# Patient Record
(demographics unavailable — no encounter records)

---

## 2024-10-22 NOTE — DISCUSSION/SUMMARY
[de-identified] : Patient I talked about the possible underlying etiology of her right knee pain.  Patient has a history of squatting causing pain and swelling this may be attributable to a possible meniscal tear.  Patient had the knee aspirated yielding approximately 60 cc of clear straw-colored fluid.  Patient was told to ice the knee continue take over-the-counter anti-inflammatories she was given a description of a Genutrain brace that I believe she should wear and hopefully that gives her enough support to get off the crutches and utilize a cane.  Due to the highly suspicious nature of her injury patient was sent for an MRI to help evaluate for possible bucket-handle tear of the meniscus.  We also be looking for any ACL pathology.  Patient be notified of any findings any change in treatment plan once the MRI images are available for review.  This consultation lasted 30 minutes.

## 2024-10-22 NOTE — REASON FOR VISIT
[Initial Visit] : an initial visit for [Knee Pain] : knee pain [FreeTextEntry2] : RIGHT KNEE INJURY ON 10/16/24. FEELS LIKE SHOOTING PAIN, FEELS BETTER WITH REST AND CERTAIN POSITIONS AND WORSE WHEN WALKING AND MOVING THE KNEE.

## 2024-10-22 NOTE — PHYSICAL EXAM
[de-identified] : Right knee has a fairly large effusion.  Because of the effusion range of motion is limited from 20 to 70 degrees.  And also because of the effusion is difficult to quantify medial or lateral joint line tenderness or any evidence of instability.

## 2024-10-22 NOTE — HISTORY OF PRESENT ILLNESS
[de-identified] : First-time visit for this otherwise healthy 39-year-old female who is here with a right knee injury.  Last Wednesday i.e. 6 days ago the patient did some squatting while doing some household work and felt a pop and swelling in the right knee she is having difficulty weightbearing.  The knee appears fairly swollen also.  Patient went to a local [facility] where she had radiographs she was told there was no evidence of fracture or bony abnormalities.  We do not have the images or report to verify this.  Patient comes in today with difficulty with weightbearing and utilizing crutches.

## 2024-10-22 NOTE — PROCEDURE
[de-identified] : Patient had the right knee aspirated yielding approximately 60 cc of clear straw-colored fluid no bloody component noted.  Patient tolerated the aspiration well.

## 2024-11-05 NOTE — PHYSICAL EXAM
[de-identified] : Right knee has no effusion today there is significant quad atrophy compared to the contralateral side.  Range of motion is 10 to 125 degrees.  The knee is stable to stress varus valgus stress.  There is some tenderness with compression of the patellofemoral articulation no effusion minimal soft tissue swelling minimal warmth. [de-identified] : MRI results stated above history.

## 2024-11-05 NOTE — REASON FOR VISIT
[Follow-Up Visit] : a follow-up visit for [Knee Pain] : knee pain [FreeTextEntry2] : REVIEW MRI RESUTLS FOR RIGHT KNEE PAIN

## 2024-11-05 NOTE — HISTORY OF PRESENT ILLNESS
[de-identified] : Patient returns today approximate 3 weeks after her right knee injury.  Patient recall had the knee aspirated at initial visit on October 22.  This yielded approximately 60 cc of clear straw-colored fluid.  Due to the fact the patient was still exhibiting difficulty with full extension she was sent for an MRI the results are available for review and they indicate no meniscal pathology no ligamentous pathology but rather just some moderate patellofemoral chondral loss/injury.  We reviewed the findings of the MRI with the patient at length today.  Patient continues to utilize crutches she does not "trust" the knee.

## 2024-11-05 NOTE — DISCUSSION/SUMMARY
[de-identified] : Patient I talked at length about the underlying etiology of her right knee pain.  Her injury she had a traction type injury of the patellofemoral relation which caused some chondral injuries/edema that we noted on the MRI.  I believe the patient would benefit from continued icing of the knee she also placed on Celebrex 200 mg p.o. twice daily for a 6-day course and taken thereafter as needed for pain.  Reasonable risk and benefits of medication were discussed in detail including potential side effects.  We also reviewed the patient's current medications and her was to be no relative current contraindication to its limited use.  Patient will follow-up in 10 days if needed if she does not see any sustained improvement with this therapy of rest ice and physical therapy.  Beyond that she will also be assessed in 6 weeks time by that point she should have return to full function of the knee.  We stressed the importance of working on improving her quad tone and balancing the quadriceps to improve patellar tracking.  This consultation lasted 30 minutes.

## 2024-11-14 NOTE — DISCUSSION/SUMMARY
[de-identified] : Patient was told to redouble her efforts to improve her right quad strength she will also continue to take the Celebrex as needed we will issue her a new prescription today.  Patient will follow-up with either me or Dr. Roa or  in [place] after Thanksgiving if she not see any improvement with this current regimen.  Again we are stressing need to continue with formal physical therapy there is also told his very specific exercise to utilize the leg extension machine to help improve quad tone on her own as well.  This consultation lasted 20 minutes

## 2024-11-14 NOTE — HISTORY OF PRESENT ILLNESS
[de-identified] : Patient returns today this is her third visit she was seen initially October 22 patient continues have difficulty with her right knee she has pain anteriorly with extending the knee she is ambulating with a crutch.  She just began physical therapy she is not satisfied with the current physical therapy outfit and will be changing to a new physical therapy outfit today.  She is here for repeat evaluation.  Patient states the Celebrex helped her on a limited basis.

## 2024-11-14 NOTE — PHYSICAL EXAM
[de-identified] : Right knee has no effusion today there is significant quad atrophy compared to the contralateral side.  Range of motion is 10 to 125 degrees.  The knee is stable to stress varus valgus stress.  There is some tenderness with compression of the patellofemoral articulation no effusion minimal soft tissue swelling minimal warmth.  Essentially unchanged from the previous visit.

## 2024-11-14 NOTE — REASON FOR VISIT
[Follow-Up Visit] : a follow-up visit for [Knee Pain] : knee pain [FreeTextEntry2] : ACUTE RIGHT KNEE PAIN, PRESCRIBED CELEBREX AND PHYSICAL THERAPY LAST VISIT.

## 2024-11-28 NOTE — HISTORY OF PRESENT ILLNESS
[de-identified] : Initial visit: Right knee Reason: no falls or injures  Duration: 1 month Prior studies: Mri done in [facility]  Symptoms shooting pain / limping  Aggravating Fx: straighten the leg or bending / putting pressure  Alleviating Fx:  Pain level: 9/10 Pain medication: Celecoxib Medical Hx:no Surgical Hx: Current Medication:no Allergies:no

## 2024-11-28 NOTE — PHYSICAL EXAM
[de-identified] : Right knee  Constitutional:  The patient is healthy-appearing and in no apparent distress.   Gait: The patient ambulates with a normal gait and no limp.  Cardiovascular System:  The capillary refill is less than 2 seconds.   Skin:  There are no skin abnormalities.  Right Knee:   Bony Palpation:  There is no tenderness of the medial joint line.  There is no tenderness of the lateral joint line. There is no tenderness of the medial femoral chondyle. There is no tenderness of the lateral femoral chondyle. There is no tenderness of the tibial tubercle. There is no tenderness of the superior patella. There is no tenderness of the inferior patella. There is tenderness of the medial patellar facet. There is tenderness of the lateral patellar facet.  Soft Tissue Palpation:  There is no tenderness of the MCL. There is no tenderness of the medial retinaculum. There is no tenderness of the lateral retinaculum. There is no tenderness of the LCL. There is no tenderness of the quadriceps tendon. There is no tenderness of the patella tendon. There is no tenderness of the ITB. There is no tenderness of the pes anserine.  Active Range of Motion:  The range of motion at the knee actively and passively is full.   Special Tests:  There is a negative Apley. There is a negative Steinmanns.  There is a negative Lachman and Anterior Drawer. There is a negative Posterior Drawer.   There is no varus or valgus laxity.  Strength:  There is 4/5 hip flexion and 5/5 knee flexion and extension.    Psychiatric:  The patient demonstrates a normal mood and affect and is active and alert.   [de-identified] : MRI right knee ([facility]):  There is mild to moderate medial patellar facet arthritis

## 2024-11-28 NOTE — PHYSICAL EXAM
[de-identified] : Right knee  Constitutional:  The patient is healthy-appearing and in no apparent distress.   Gait: The patient ambulates with a normal gait and no limp.  Cardiovascular System:  The capillary refill is less than 2 seconds.   Skin:  There are no skin abnormalities.  Right Knee:   Bony Palpation:  There is no tenderness of the medial joint line.  There is no tenderness of the lateral joint line. There is no tenderness of the medial femoral chondyle. There is no tenderness of the lateral femoral chondyle. There is no tenderness of the tibial tubercle. There is no tenderness of the superior patella. There is no tenderness of the inferior patella. There is tenderness of the medial patellar facet. There is tenderness of the lateral patellar facet.  Soft Tissue Palpation:  There is no tenderness of the MCL. There is no tenderness of the medial retinaculum. There is no tenderness of the lateral retinaculum. There is no tenderness of the LCL. There is no tenderness of the quadriceps tendon. There is no tenderness of the patella tendon. There is no tenderness of the ITB. There is no tenderness of the pes anserine.  Active Range of Motion:  The range of motion at the knee actively and passively is full.   Special Tests:  There is a negative Apley. There is a negative Steinmanns.  There is a negative Lachman and Anterior Drawer. There is a negative Posterior Drawer.   There is no varus or valgus laxity.  Strength:  There is 4/5 hip flexion and 5/5 knee flexion and extension.    Psychiatric:  The patient demonstrates a normal mood and affect and is active and alert.   [de-identified] : MRI right knee ([facility]):  There is mild to moderate medial patellar facet arthritis

## 2024-11-28 NOTE — HISTORY OF PRESENT ILLNESS
[de-identified] : Initial visit: Right knee Reason: no falls or injures  Duration: 1 month Prior studies: Mri done in [facility]  Symptoms shooting pain / limping  Aggravating Fx: straighten the leg or bending / putting pressure  Alleviating Fx:  Pain level: 9/10 Pain medication: Celecoxib Medical Hx:no Surgical Hx: Current Medication:no Allergies:no

## 2024-11-28 NOTE — ASSESSMENT
[FreeTextEntry1] : Discussed at length with patient regarding symptoms and diagnosis.  Treatment options discussed and patient's questions answered and patient expresses understanding.   Patient offered cortisone injection, but declines at this time.  Patient offered physical therapy and home exercises.  Patient to follow-up in office if persistent or recurrent symptoms.  Patient instructed to call if any questions or concerns.

## 2024-12-17 NOTE — HISTORY OF PRESENT ILLNESS
[de-identified] : Patient returns today for evaluation of the right knee she had seen Dr. Roa in my absence over the Thanksgiving holidays.  Overall she states the knee is slowly improving with physical therapy she has not was taking the Celebrex as prescribed as needed intermittently.  She still has pain going down stairs and getting out of a low squatting position but overall is improving.

## 2024-12-17 NOTE — PHYSICAL EXAM
[de-identified] : Right knee has no effusion today there is notable quad atrophy compared to the contralateral side.  Range of motion is 10 to 125 degrees.  The knee is stable to stress varus valgus stress.  There is some tenderness with compression of the patellofemoral articulation no effusion minimal soft tissue swelling minimal warmth.  Essentially unchanged from the previous visit.  Mild crepitus with range of motion.  Especially with terminal extension.

## 2024-12-17 NOTE — DISCUSSION/SUMMARY
[de-identified] : Patient is showing slow but steady improvement she still continues to show significant right VMO atrophy compared to the left.  I indicated the patient may take an additional 5 to 6 weeks of physical therapy if it is focused and efficient to help improve her quad strength and patellar alignment.  Patient was given a refill on her Celebrex prescription be taken as needed for intermittent discomfort also to ice the knee as needed.  Follow-up with us by the end of January if she continues to have symptoms at that point may consider use of an HA injection.  Today's consultation lasted 20 minutes exclusive of teaching time and any separately billed procedures.

## 2024-12-17 NOTE — REASON FOR VISIT
[Follow-Up Visit] : a follow-up visit for [Knee Pain] : knee pain [FreeTextEntry2] : acute right knee pain, following up after physical therapy

## 2025-01-07 NOTE — PHYSICAL EXAM
[de-identified] : Right lower extremity quadriceps still showed moderate atrophy in terms of both tone and size compared to the contralateral left.  Range of motion of the knee is full with stable hip stress varus valgus present with full extension and 90 degrees of flexion.  Minimal crepitus with minimal warmth about the knee.

## 2025-01-07 NOTE — HISTORY OF PRESENT ILLNESS
[de-identified] : Well-known patient returns today seen most recently on December 17 approximate 3 weeks ago.  Patient has been very compliant with physical therapy she finally sees improvement in stairclimbing with decreased discomfort in the anterior aspect of the right knee.  Recall she has diagnosis of chondromalacia of the right knee.  Patient has been sent for physical therapy she will take Celebrex as needed for pain.

## 2025-01-07 NOTE — REASON FOR VISIT
[Follow-Up Visit] : a follow-up visit for [Knee Pain] : knee pain [FreeTextEntry2] : Chondromalacia of right knee, prescribed celebrex last visit.

## 2025-01-07 NOTE — DISCUSSION/SUMMARY
[de-identified] : Patient I discussed how she is finally seeing some improvement of her symptoms with her physical therapy she still has some work to go to further strengthen her quads I indicated that once the quads are strengthened and similar to the contralateral left she should be pain-free going up and down steps that should probably take another 5 to 6 weeks of focused quad training.  Patient will follow-up in 5 to 6 weeks if not thoroughly improved for repeat evaluation at that point.  Today's consultation lasted 30 minutes exclusive of teaching time and separately billed procedures.

## 2025-02-18 NOTE — PHYSICAL EXAM
[de-identified] : Right lower extremity quadriceps still showedImprovement in the moderate atrophy in terms of both tone and size compared to the contralateral left.  Range of motion of the knee is full with stable hip stress varus valgus present with full extension and 90 degrees of flexion.  Minimal crepitus with minimal warmth about the knee.

## 2025-02-18 NOTE — DISCUSSION/SUMMARY
[de-identified] : Patient I talked about her options we talked about the potential use of HA injections for the knee to help reduce some of her symptoms I thought this would almost certainly provide her sustained symptomatic improvement with the fact that she only has modest degenerative changes and does not have a high BMI.  Patient will consider this option however this point she will just continue to monitor symptoms continue with her exercise regimen continue to ice and take over-the-counter anti-inflammatories as needed.  Patient will follow-up in 6 weeks time if she continues to be symptomatic otherwise we will follow-up on a as needed basis.  This consultation was 30 minutes exclusive teaching time and any separately billed procedures.

## 2025-02-18 NOTE — HISTORY OF PRESENT ILLNESS
[de-identified] : Patient returns todayShe was seen approximately 6 weeks ago.  Patient has been doing her physical therapy as far as the right knee is concerned she has modest patellofemoral arthritis the medial patellofemoral facet.  Patient continues to be symptomatic but she is showing slow improvement.  She is here for repeat evaluation.

## 2025-02-18 NOTE — REASON FOR VISIT
[Initial Consultation] : an initial consultation for [FreeTextEntry2] : follow up right knee chondromalacia , feels like improvement symptoms have not been improving. Currently active in PT

## 2025-04-15 NOTE — REASON FOR VISIT
[Follow-Up Visit] : a follow-up visit for [Knee Pain] : knee pain [FreeTextEntry2] : acute right knee pain.

## 2025-04-15 NOTE — HISTORY OF PRESENT ILLNESS
[de-identified] : Well-known patient returns today seen most recently February 18 of this year.  Patient has been having issues with her right knee on again off again for the last 6 months or so.  She has a diagnosis of chondromalacia she was told to work on her quad strength and she did so with success however recently she has not been doing her exercises but is noticing increasing discomfort in the distal part of the quad tendon and also underneath the kneecap.  Pain is worse with stair climbing.  She is here for repeat evaluation.

## 2025-04-15 NOTE — DISCUSSION/SUMMARY
[de-identified] : Patient I discussed the results of the previous MRI that was performed in November 2024 which indicated chondromalacia and mild early OA with some cartilage loss of the lateral patellofemoral facet.  I encouraged the patient to resume her exercise regimen to improve her VMO and overall balance of the quadriceps.  She will be placed again on Celebrex 200 mg p.o. twice daily for 6-day course and to take thereafter as needed.  I once again recommended consideration with HA injections patient deferred but will consider this option if not sustained symptomatic for the next 6 weeks.  This consultation lasted 20 minutes exclusive teaching time and any separately billed procedures.

## 2025-04-15 NOTE — PHYSICAL EXAM
[de-identified] : Patient has some tenderness to palpation of the distal quad tendon insertion into the patella.  The extensor mechanism is intact.  There is no undue swelling no effusion.  No undue warmth range of motion 0 to 125 degrees there is some mild quadriceps atrophy on the right compared to the left.